# Patient Record
Sex: FEMALE | Race: WHITE | NOT HISPANIC OR LATINO | Employment: FULL TIME | URBAN - METROPOLITAN AREA
[De-identification: names, ages, dates, MRNs, and addresses within clinical notes are randomized per-mention and may not be internally consistent; named-entity substitution may affect disease eponyms.]

---

## 2024-03-27 ENCOUNTER — APPOINTMENT (OUTPATIENT)
Dept: GENERAL RADIOLOGY | Facility: CLINIC | Age: 64
End: 2024-03-27
Attending: EMERGENCY MEDICINE
Payer: COMMERCIAL

## 2024-03-27 ENCOUNTER — HOSPITAL ENCOUNTER (EMERGENCY)
Facility: CLINIC | Age: 64
Discharge: HOME | End: 2024-03-27
Attending: EMERGENCY MEDICINE | Admitting: EMERGENCY MEDICINE
Payer: COMMERCIAL

## 2024-03-27 VITALS
HEART RATE: 82 BPM | DIASTOLIC BLOOD PRESSURE: 110 MMHG | RESPIRATION RATE: 16 BRPM | WEIGHT: 230 LBS | BODY MASS INDEX: 34.07 KG/M2 | SYSTOLIC BLOOD PRESSURE: 179 MMHG | OXYGEN SATURATION: 98 % | TEMPERATURE: 98.3 F | HEIGHT: 69 IN
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
## 2024-03-27 PROCEDURE — 90715 TDAP VACCINE 7 YRS/> IM: CPT | Performed by: EMERGENCY MEDICINE

## 2024-03-27 PROCEDURE — 12001 RPR S/N/AX/GEN/TRNK 2.5CM/<: CPT | Performed by: EMERGENCY MEDICINE

## 2024-03-27 PROCEDURE — 99284 EMERGENCY DEPT VISIT MOD MDM: CPT | Mod: 25 | Performed by: EMERGENCY MEDICINE

## 2024-03-27 PROCEDURE — 90471 IMMUNIZATION ADMIN: CPT | Performed by: EMERGENCY MEDICINE

## 2024-03-27 PROCEDURE — 73090 X-RAY EXAM OF FOREARM: CPT | Mod: LT

## 2024-03-27 PROCEDURE — 250N000011 HC RX IP 250 OP 636: Performed by: EMERGENCY MEDICINE

## 2024-03-27 PROCEDURE — 99283 EMERGENCY DEPT VISIT LOW MDM: CPT | Mod: 25 | Performed by: EMERGENCY MEDICINE

## 2024-03-27 RX ORDER — DOXYCYCLINE 100 MG/1
100 CAPSULE ORAL 2 TIMES DAILY
Qty: 20 CAPSULE | Refills: 0 | Status: SHIPPED | OUTPATIENT
Start: 2024-03-27 | End: 2024-04-06

## 2024-03-27 RX ADMIN — CLOSTRIDIUM TETANI TOXOID ANTIGEN (FORMALDEHYDE INACTIVATED), CORYNEBACTERIUM DIPHTHERIAE TOXOID ANTIGEN (FORMALDEHYDE INACTIVATED), BORDETELLA PERTUSSIS TOXOID ANTIGEN (GLUTARALDEHYDE INACTIVATED), BORDETELLA PERTUSSIS FILAMENTOUS HEMAGGLUTININ ANTIGEN (FORMALDEHYDE INACTIVATED), BORDETELLA PERTUSSIS PERTACTIN ANTIGEN, AND BORDETELLA PERTUSSIS FIMBRIAE 2/3 ANTIGEN 0.5 ML: 5; 2; 2.5; 5; 3; 5 INJECTION, SUSPENSION INTRAMUSCULAR at 10:10

## 2024-03-27 ASSESSMENT — COLUMBIA-SUICIDE SEVERITY RATING SCALE - C-SSRS
2. HAVE YOU ACTUALLY HAD ANY THOUGHTS OF KILLING YOURSELF IN THE PAST MONTH?: NO
1. IN THE PAST MONTH, HAVE YOU WISHED YOU WERE DEAD OR WISHED YOU COULD GO TO SLEEP AND NOT WAKE UP?: NO
6. HAVE YOU EVER DONE ANYTHING, STARTED TO DO ANYTHING, OR PREPARED TO DO ANYTHING TO END YOUR LIFE?: NO

## 2024-03-27 ASSESSMENT — ACTIVITIES OF DAILY LIVING (ADL)
ADLS_ACUITY_SCORE: 33
ADLS_ACUITY_SCORE: 35

## 2024-03-27 NOTE — ED NOTES
Pt back from XR, updated on her td immunization.  Pt up to BR, offered snack and water, pt refuses at this time.     PLAN:  Await repair and further orders

## 2024-03-27 NOTE — DISCHARGE INSTRUCTIONS
Take Doxycycline twice daily for 10 days.     Please keep the wound clean and dry for 24-48 hours. The affected area should be kept elevated as much as possible.  After 24 hours, the dressing may be removed and the wound may be gently cleaned with warm water and soap.  You may apply petroleum based ointment as desired.  You should return in 7-10 days to your primary care physician or the emergency department for suture removal.     Any time the skin is damaged, scar formation is unavoidable. You can minimize the scar by following the above instructions and preventing infection. The scar will continue to evolve for at least 1 year. Final appearance of the scar will not be known until at least that time. Please apply sun screen to the scar before any sun exposure for the first year as sunburn or even tanning may cause the scar to become discolored and lead to poor cosmetic outcomes. If after 1 year, you are unhappy with the appearance of the scar you should seek follow-up with the appropriate health care professional to discuss further options.    You should return to the emergency department or your primary care physician immediately if you develop warmth, redness, swelling, drainage from the wound, or have fevers.    No dishes until stitches are removed!

## 2024-03-27 NOTE — ED TRIAGE NOTES
Pt here with dog bite to left forearm. The pt's rescue/foster dog she is unsure if it is vaccinated but she has had it for 8 months and never off a leash; she is not concerned. Pt is more looking for antibiotics and if her arm needs suturing.      Triage Assessment (Adult)       Row Name 03/27/24 0823          Triage Assessment    Airway WDL WDL        Respiratory WDL    Respiratory WDL WDL        Peripheral/Neurovascular WDL    Peripheral Neurovascular WDL WDL        Cognitive/Neuro/Behavioral WDL    Cognitive/Neuro/Behavioral WDL WDL

## 2024-03-27 NOTE — ED NOTES
Pt arrived via triage, ambulatory and in no acute distress.  Pt with puncture wounds and tear to FA, from her rescue [employer].  The dog is 2 yrs old and they do not know his shot status.  THey have had him for 8 months and he has not been to a Vet.  Wounds are deep, bleeding controled, arm swelling and bruising noted.  Pt with full ROM to fingers, denies need for medication for comfort at this time.   Ice pack and instructions provided.       Pt not up to date with her td immunization, this was discussed and will give today.  PLAN:  Will await MD assessment and further orders.

## 2024-03-27 NOTE — ED PROVIDER NOTES
"  History     Chief Complaint   Patient presents with    Dog Bite     Left forearm, pt's rescue/foster dog     HPI  Therese Dumont is a 63 year old female with history of hypertension who presents with dog bite to left forearm. Dog is a rescue that her son has had for the past 8 months. Uncertain about immunization status but dog has been housed or leashed and they have the dog and can monitor for signs of rabies. She has no concern about rabies. Penicillin allergy listed on chart. She is unclear of reaction (was a child).     The patient's PMHx, Surgical Hx, Allergies, and Medications were all reviewed with the patient.    Allergies:  Allergies   Allergen Reactions    Penicillins Other (See Comments) and Rash     as a child, thinks fever went higher       Problem List:    There are no problems to display for this patient.       Past Medical History:    No past medical history on file.    Past Surgical History:    No past surgical history on file.    Family History:    No family history on file.    Social History:  Marital Status:   [2]        Medications:    doxycycline hyclate (VIBRAMYCIN) 100 MG capsule          Review of Systems  Pertinent positives and negatives mentioned in HPI    Physical Exam   BP: (!) 179/110  Pulse: 82  Temp: 98.3  F (36.8  C)  Resp: 20  Height: 175.3 cm (5' 9\")  Weight: 104.3 kg (230 lb)  SpO2: 98 %    GEN: Awake, alert, and cooperative. Resting comfortably.  CV : Extremities warm and well perfused.  PULM: Normal effort. Speaking in full sentences.  NEURO: Normal speech. Following commands.  Answering questions and interacting appropriately.   EXT: No gross deformity. CMS intact in left forearm and hand. Normal  strength. Sensation intact to light touch. Brisk cap refill in nail beds.   INT: 2 cm wound to left anterior forearm, puncture wound to left posterior forearm              ED [facility]    -Laceration Repair    Date/Time: " 3/27/2024 10:36 AM    Performed by: Duke Rashid MD  Authorized by: Duke Rashid MD    Risks, benefits and alternatives discussed.      ANESTHESIA (see MAR for exact dosages):     Anesthesia method:  Local infiltration    Local anesthetic:  Bupivacaine 0.25% w/o epi  LACERATION DETAILS     Location:  Shoulder/arm    Shoulder/arm location:  L lower arm    Length (cm):  2    REPAIR TYPE:     Repair type:  Simple    EXPLORATION:     Hemostasis achieved with:  Direct pressure    Wound exploration: entire depth of wound probed and visualized      Wound extent: no foreign body and no underlying fracture      Contaminated: no      TREATMENT:     Irrigation solution:  Sterile saline    Irrigation volume:  300    Irrigation method:  Syringe    Visualized foreign bodies/material removed: no      SKIN REPAIR     Repair method:  Sutures    Suture size:  4-0    Suture material:  Nylon    Suture technique:  Simple interrupted    Number of sutures:  2    APPROXIMATION     Approximation:  Loose    POST-PROCEDURE DETAILS     Dressing:  Non-adherent dressing      PROCEDURE    Patient Tolerance:  Patient tolerated the procedure well with no immediate complications                   Critical Care time:  none               Results for orders placed or performed during the hospital encounter of 03/27/24 (from the past 24 hour(s))   Radius/Ulna XR,  PA &LAT, left    Narrative    XR FOREARM LEFT 2 VIEWS 3/27/2024 10:06 AM     HISTORY: dog bit to left forearm. please eval for retained foreign  body or bony involvement    COMPARISON: None.       Impression    IMPRESSION: The left forearm is negative for fracture or dislocation.  No foreign bodies are identified. There is some edema within the  subcutaneous soft tissues along the distal forearm, primarily along  the flexor and ulnar quadrant.    RANGEL OSBORNE MD         SYSTEM ID:  [id number]       Medications   Tdap (tetanus-diphtheria-acell pertussis) (ADACEL) injection 0.5  mL (0.5 mLs Intramuscular $Given 3/27/24 1010)       Assessments & Plan (with Medical Decision Making)   63 year old female with past medical history of hypertension who presents with dog bite to left forearm detailed in HPI.  She has 2 cm wound on anterior forearm and a puncture wound on the posterior forearm.  Plain films do not show any retained foreign body or bony involvement. Images reviewed personally as well as report from radiology which is detailed above.  Her tetanus was updated here in the department.  Wound washed out thoroughly.  Puncture wound left heel by secondary intention and 2 cm wound was loosely closed w/ 2 4-0 nylon sutures.  Prescription for doxycycline given her penicillin allergy.  She will monitor her dog for signs of rabies.  Follow-up, wound care, ED return precautions discussed.          I have reviewed the nursing notes.         New Prescriptions    DOXYCYCLINE HYCLATE (VIBRAMYCIN) 100 MG CAPSULE    Take 1 capsule (100 mg) by mouth 2 times daily for 10 days       Final diagnoses:   Dog bite, initial encounter     Duke Rashid MD        3/27/2024   [facility] EMERGENCY DEPT    Disclaimer: This note consists of words and symbols derived from keyboarding and dictation using voice recognition software.  As a result, there may be errors that have gone undetected.  Please consider this when interpreting information found in this note.               Duke Rashid MD  03/27/24 1037       Duke Rashid MD  03/27/24 1038